# Patient Record
Sex: FEMALE | Race: BLACK OR AFRICAN AMERICAN | NOT HISPANIC OR LATINO | Employment: FULL TIME | URBAN - METROPOLITAN AREA
[De-identification: names, ages, dates, MRNs, and addresses within clinical notes are randomized per-mention and may not be internally consistent; named-entity substitution may affect disease eponyms.]

---

## 2017-01-30 ENCOUNTER — HOSPITAL ENCOUNTER (EMERGENCY)
Facility: MEDICAL CENTER | Age: 42
End: 2017-01-30
Attending: EMERGENCY MEDICINE
Payer: MEDICAID

## 2017-01-30 ENCOUNTER — APPOINTMENT (OUTPATIENT)
Dept: RADIOLOGY | Facility: MEDICAL CENTER | Age: 42
End: 2017-01-30
Attending: EMERGENCY MEDICINE
Payer: MEDICAID

## 2017-01-30 VITALS
DIASTOLIC BLOOD PRESSURE: 112 MMHG | BODY MASS INDEX: 24.91 KG/M2 | SYSTOLIC BLOOD PRESSURE: 167 MMHG | RESPIRATION RATE: 16 BRPM | TEMPERATURE: 97.3 F | OXYGEN SATURATION: 96 % | WEIGHT: 168.21 LBS | HEIGHT: 69 IN | HEART RATE: 92 BPM
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
## 2017-01-30 DIAGNOSIS — S09.90XA CHI (CLOSED HEAD INJURY), INITIAL ENCOUNTER: ICD-10-CM

## 2017-01-30 DIAGNOSIS — R04.0 EPISTAXIS: ICD-10-CM

## 2017-01-30 DIAGNOSIS — H11.32 SUBCONJUNCTIVAL HEMORRHAGE, LEFT: ICD-10-CM

## 2017-01-30 DIAGNOSIS — S01.21XA NASAL LACERATION, INITIAL ENCOUNTER: ICD-10-CM

## 2017-01-30 PROCEDURE — 700102 HCHG RX REV CODE 250 W/ 637 OVERRIDE(OP): Performed by: EMERGENCY MEDICINE

## 2017-01-30 PROCEDURE — A9270 NON-COVERED ITEM OR SERVICE: HCPCS | Performed by: EMERGENCY MEDICINE

## 2017-01-30 PROCEDURE — 70486 CT MAXILLOFACIAL W/O DYE: CPT

## 2017-01-30 PROCEDURE — 99285 EMERGENCY DEPT VISIT HI MDM: CPT

## 2017-01-30 PROCEDURE — 90471 IMMUNIZATION ADMIN: CPT

## 2017-01-30 PROCEDURE — 700111 HCHG RX REV CODE 636 W/ 250 OVERRIDE (IP): Performed by: EMERGENCY MEDICINE

## 2017-01-30 PROCEDURE — 90715 TDAP VACCINE 7 YRS/> IM: CPT | Performed by: EMERGENCY MEDICINE

## 2017-01-30 RX ORDER — TRAMADOL HYDROCHLORIDE 50 MG/1
50-100 TABLET ORAL EVERY 6 HOURS PRN
Qty: 20 TAB | Refills: 0 | Status: SHIPPED | OUTPATIENT
Start: 2017-01-30

## 2017-01-30 RX ORDER — CEPHALEXIN 500 MG/1
500 CAPSULE ORAL 4 TIMES DAILY
Qty: 28 CAP | Refills: 0 | Status: SHIPPED | OUTPATIENT
Start: 2017-01-30

## 2017-01-30 RX ORDER — HYDROCODONE BITARTRATE AND ACETAMINOPHEN 5; 325 MG/1; MG/1
1-2 TABLET ORAL EVERY 4 HOURS PRN
Qty: 21 TAB | Refills: 0 | Status: SHIPPED | OUTPATIENT
Start: 2017-01-30

## 2017-01-30 RX ORDER — TRAMADOL HYDROCHLORIDE 50 MG/1
50 TABLET ORAL ONCE
Status: COMPLETED | OUTPATIENT
Start: 2017-01-30 | End: 2017-01-30

## 2017-01-30 RX ORDER — TRAMADOL HYDROCHLORIDE 50 MG/1
100 TABLET ORAL ONCE
Status: COMPLETED | OUTPATIENT
Start: 2017-01-30 | End: 2017-01-30

## 2017-01-30 RX ADMIN — TRAMADOL HYDROCHLORIDE 50 MG: 50 TABLET, COATED ORAL at 01:20

## 2017-01-30 RX ADMIN — TRAMADOL HYDROCHLORIDE 50 MG: 50 TABLET, COATED ORAL at 01:27

## 2017-01-30 RX ADMIN — CLOSTRIDIUM TETANI TOXOID ANTIGEN (FORMALDEHYDE INACTIVATED), CORYNEBACTERIUM DIPHTHERIAE TOXOID ANTIGEN (FORMALDEHYDE INACTIVATED), BORDETELLA PERTUSSIS TOXOID ANTIGEN (GLUTARALDEHYDE INACTIVATED), BORDETELLA PERTUSSIS FILAMENTOUS HEMAGGLUTININ ANTIGEN (FORMALDEHYDE INACTIVATED), BORDETELLA PERTUSSIS PERTACTIN ANTIGEN, AND BORDETELLA PERTUSSIS FIMBRIAE 2/3 ANTIGEN 0.5 ML: 5; 2; 2.5; 5; 3; 5 INJECTION, SUSPENSION INTRAMUSCULAR at 01:20

## 2017-01-30 ASSESSMENT — LIFESTYLE VARIABLES: DO YOU DRINK ALCOHOL: NO

## 2017-01-30 ASSESSMENT — PAIN SCALES - GENERAL: PAINLEVEL_OUTOF10: 5

## 2017-01-30 NOTE — ED NOTES
Patient given discharge teaching and education. Verbalizes understanding. Given chance to ask questions, all questions answered. Educated patient of signs and symptoms to returned to ER, and educated patient they can return for any concerning symptoms. Three prescriptions provided to patient. Education given to patient about medications.Patient states they have their belongings. Denies additional needs at this time. Reports pain is well controlled. Patient monitored every hour and PRN for safety and comfort. Patient ambulatory out of department.
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
## 2017-01-30 NOTE — ED AVS SNAPSHOT
youwho Access Code: [email or web address]  Expires: 3/1/2017  2:38 AM    Your email address is not on file at [facility].  Email Addresses are required for you to sign up for youwho, please contact [phone] to verify your personal information and to provide your email address prior to attempting to register for youwho.    Padmini Tobin  No address on file    youwho  A secure, online tool to manage your health information     [facility]’s youwho® is a secure, online tool that connects you to your personalized health information from the privacy of your home -- day or night - making it very easy for you to manage your healthcare. Once the activation process is completed, you can even access your medical information using the youwho edmar, which is available for free in the Apple Edmar store or Google Play store.     To learn more about youwho, visit [email or web address]    There are two levels of access available (as shown below):   My Chart Features  [facility] Primary Care Doctor [facility]  Specialists [facility]  Urgent  Care Non-[facility] Primary Care Doctor   Email your healthcare team securely and privately 24/7 X X X    Manage appointments: schedule your next appointment; view details of past/upcoming appointments X      Request prescription refills. X      View recent personal medical records, including lab and immunizations X X X X   View health record, including health history, allergies, medications X X X X   Read reports about your outpatient visits, procedures, consult and ER notes X X X X   See your discharge summary, which is a recap of your hospital and/or ER visit that includes your diagnosis, lab results, and care plan X X  X     How to register for [employer]:  Once your e-mail address has been verified, follow the following steps to sign up for youwho.     1. Go to  [email or web address]  2. Click on the Sign Up Now box, which takes you to the New Member Sign Up page. You will need to provide the  following information:  a. Enter your [employer] Access Code exactly as it appears at the top of this page. (You will not need to use this code after you’ve completed the sign-up process. If you do not sign up before the expiration date, you must request a new code.)   b. Enter your date of birth.   c. Enter your home email address.   d. Click Submit, and follow the next screen’s instructions.  3. Create a CinemaNow ID. This will be your CinemaNow login ID and cannot be changed, so think of one that is secure and easy to remember.  4. Create a [employer]Now password. You can change your password at any time.  5. Enter your Password Reset Question and Answer. This can be used at a later time if you forget your password.   6. Enter your e-mail address. This allows you to receive e-mail notifications when new information is available in CinemaNow.  7. Click Sign Up. You can now view your health information.    For assistance activating your CinemaNow account, call [phone]
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
## 2017-01-30 NOTE — ED AVS SNAPSHOT
1/30/2017          Padmini Tobin  No address on file.    Dear Padmini:    [facility] wants to ensure your discharge home is safe and you or your loved ones have had all your questions answered regarding your care after you leave the hospital.    You may receive a telephone call within two days of your discharge.  This call is to make certain you understand your discharge instructions as well as ensure we provided you with the best care possible during your stay with us.     The call will only last approximately 3-5 minutes and will be done by a nurse.    Once again, we want to ensure your discharge home is safe and that you have a clear understanding of any next steps in your care.  If you have any questions or concerns, please do not hesitate to contact us, we are here for you.  Thank you for choosing [facility] for your healthcare needs.    Sincerely,    Hank Crisostomo    [facility]

## 2017-01-30 NOTE — DISCHARGE INSTRUCTIONS
Head Injuries, Adult    Return for worsening headache, repeated vomiting, confusion, seizure, unequal pupils or difficulty arousing from sleep.  Avoid activities that may cause a repeat concussion for 1-2 weeks. Take Ultram as needed for pain.    You have had a head injury which does not appear serious at this time. A concussion is a state of changed mental ability, usually from a blow to the head. You should take clear liquids for the rest of the day and then resume your regular diet. You should not take sedatives or alcoholic beverages for 48 hours after discharge. After injuries such as yours, most problems occur within the first 24 hours.    THESE MINOR SYMPTOMS MAY BE seen AFTER DISCHARGE:  · Memory difficulties  · Dizziness  · Headaches · Double vision  · Hearing difficulties   · Depression · Tiredness  · Weakness  · Difficulty with concentration ·      If you experience any of these problems, you should not be alarmed. A bruise on the brain (concussion) requires a few days for recovery. This is the same as a bruise elsewhere on the body. Many patients with head injuries frequently experience such symptoms. Usually, these problems disappear without medical care. If symptoms last for more than one day, notify your caregiver. See your caregiver sooner if symptoms are becoming worse rather than better.    · HOME CARE INSTRUCTIONS  · During the next 24 hours you must stay with someone who can watch you for the above warning signs.  · This person should wake you up every 30 minutes for 3 hours or as directed to check on your condition, noting any of the above signs or symptoms. Problems which are getting worse mean you should call or return immediately to the facility where you were just seen, or to the nearest emergency department. In case of emergency or unconsciousness, dial 911.    Although it is unlikely that serious side effects will occur, you should be aware of signs and symptoms which may necessitate your  return to this location. Side effects may occur up to 7 - 10 days following the injury.  It is important for you to carefully monitor your condition and contact your caregiver or seek immediate medical attention if there is a change in your condition.    · seek immediate medical attention if:  · There is confusion or drowsiness.   · You can not awaken the injured person.  · There is nausea (feeling sick to your stomach) or continued, forceful vomiting.  · You notice dizziness or unsteadiness which is getting worse, or inability to walk.  · You have convulsions or unconsciousness.  · You experience severe, persistent headaches not relieved by Tylenol®. (Do not take aspirin as this impairs clotting abilities). Take other pain medications only as directed.  · You can not use arms or legs normally.  · There are changes in pupil sizes. (This is the black center in the colored part of the eye)  · There is clear or bloody discharge from the nose or ears.    AGREEMENT BETWEEN PATIENT AND HEALTHCARE TEAM:  Your signature on this document represents an understanding between you and the healthcare team that took care of you today.  That means that you:  · Understand these discharge instructions.   · Will monitor your condition.  · Will seek immediate medical attention as instructed.    Document Released: 12/18/2006  Document Re-Released: 06/30/2008  [facility]® Patient Information ©2009 [employer].    Facial Fracture  A facial fracture is a break in one of the bones of your face.  HOME CARE INSTRUCTIONS   · Protect the injured part of your face until it is healed.  · Do not participate in activities which give chance for re-injury until your doctor approves.  · Gently wash and dry your face.  · Wear head and facial protection while riding a bicycle, motorcycle, or snowmobile.  SEEK MEDICAL CARE IF:   · An oral temperature above 102° F (38.9° C) develops.  · You have severe headaches or notice changes in your vision.  · You  have new numbness or tingling in your face.  · You develop nausea (feeling sick to your stomach), vomiting or a stiff neck.  SEEK IMMEDIATE MEDICAL CARE IF:   · You develop difficulty seeing or experience double vision.  · You become dizzy, lightheaded, or faint.  · You develop trouble speaking, breathing, or swallowing.  · You have a watery discharge from your nose or ear.  MAKE SURE YOU:   · Understand these instructions.  · Will watch your condition.  · Will get help right away if you are not doing well or get worse.  Document Released: 12/18/2006 Document Revised: 03/11/2013 Document Reviewed: 08/06/2009  [employer]® Patient Information ©2014 [employer], [employer].
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
## 2017-01-30 NOTE — ED AVS SNAPSHOT
After Visit Summary                                                                                                                Padmini Tobin   MRN: [id number]    Department:  [facility], Emergency Dept   Date of Visit:  1/30/2017            [facility], Emergency Dept    [address] [address]    Phone:  [phone]      You were seen by     1. Dash Pemberton M.D.    2. Zach Lozano M.D.      Your Diagnosis Was     CHI (closed head injury), initial encounter     S09.90XA       These are the medications you received during your hospitalization from 01/30/2017 0014 to 01/30/2017 0238     Date/Time Order Dose Route Action    01/30/2017 0120 tetanus-dipth-acell pertussis (ADACEL) inj 0.5 mL 0.5 mL Intramuscular Given    01/30/2017 0120 tramadol (ULTRAM) 50 MG tablet 100 mg 50 mg Oral Given    01/30/2017 0127 tramadol (ULTRAM) 50 MG tablet 50 mg 50 mg Oral Given      Follow-up Information     1. Follow up with [facility] In 1 week.    Contact information    [address]  [facility] [address] [phone]        2. Follow up with [facility], Emergency Dept.    Specialty:  Emergency Medicine    Why:  If symptoms worsen    Contact information    [address] [address] [phone]        3. Schedule an appointment as soon as possible for a visit with [facility].    Contact information    [address] [address] [phone]      Medication Information     Review all of your home medications and newly ordered medications with your primary doctor and/or pharmacist as soon as possible. Follow medication instructions as directed by your doctor and/or pharmacist.     Please keep your complete medication list with you and share with your physician. Update the information when medications are discontinued, doses are changed, or new medications (including over-the-counter  products) are added; and carry medication information at all times in the event of emergency situations.               Medication List      START taking these medications        Instructions    cephALEXin 500 MG Caps   Commonly known as:  KEFLEX    Take 1 Cap by mouth 4 times a day.   Dose:  500 mg       hydrocodone-acetaminophen 5-325 MG Tabs per tablet   Commonly known as:  NORCO    Take 1-2 Tabs by mouth every four hours as needed.   Dose:  1-2 Tab       tramadol 50 MG Tabs   Commonly known as:  ULTRAM    Take 1-2 Tabs by mouth every 6 hours as needed for Mild Pain.   Dose:   mg               Procedures and tests performed during your visit     CT-MAXILLOFACIAL W/O PLUS RECONS    ER EYE BOX    NURSING COMMUNICATION    VISUAL ACUITY SCREENING        Discharge Instructions       Head Injuries, Adult    Return for worsening headache, repeated vomiting, confusion, seizure, unequal pupils or difficulty arousing from sleep.  Avoid activities that may cause a repeat concussion for 1-2 weeks. Take Ultram as needed for pain.    You have had a head injury which does not appear serious at this time. A concussion is a state of changed mental ability, usually from a blow to the head. You should take clear liquids for the rest of the day and then resume your regular diet. You should not take sedatives or alcoholic beverages for 48 hours after discharge. After injuries such as yours, most problems occur within the first 24 hours.    THESE MINOR SYMPTOMS MAY BE seen AFTER DISCHARGE:  · Memory difficulties  · Dizziness  · Headaches · Double vision  · Hearing difficulties   · Depression · Tiredness  · Weakness  · Difficulty with concentration ·      If you experience any of these problems, you should not be alarmed. A bruise on the brain (concussion) requires a few days for recovery. This is the same as a bruise elsewhere on the body. Many patients with head injuries frequently experience such symptoms. Usually, these  problems disappear without medical care. If symptoms last for more than one day, notify your caregiver. See your caregiver sooner if symptoms are becoming worse rather than better.    · HOME CARE INSTRUCTIONS  · During the next 24 hours you must stay with someone who can watch you for the above warning signs.  · This person should wake you up every 30 minutes for 3 hours or as directed to check on your condition, noting any of the above signs or symptoms. Problems which are getting worse mean you should call or return immediately to the facility where you were just seen, or to the nearest emergency department. In case of emergency or unconsciousness, dial 911.    Although it is unlikely that serious side effects will occur, you should be aware of signs and symptoms which may necessitate your return to this location. Side effects may occur up to 7 - 10 days following the injury.  It is important for you to carefully monitor your condition and contact your caregiver or seek immediate medical attention if there is a change in your condition.    · seek immediate medical attention if:  · There is confusion or drowsiness.   · You can not awaken the injured person.  · There is nausea (feeling sick to your stomach) or continued, forceful vomiting.  · You notice dizziness or unsteadiness which is getting worse, or inability to walk.  · You have convulsions or unconsciousness.  · You experience severe, persistent headaches not relieved by Tylenol®. (Do not take aspirin as this impairs clotting abilities). Take other pain medications only as directed.  · You can not use arms or legs normally.  · There are changes in pupil sizes. (This is the black center in the colored part of the eye)  · There is clear or bloody discharge from the nose or ears.    AGREEMENT BETWEEN PATIENT AND HEALTHCARE TEAM:  Your signature on this document represents an understanding between you and the healthcare team that took care of you today.  That  means that you:  · Understand these discharge instructions.   · Will monitor your condition.  · Will seek immediate medical attention as instructed.    Document Released: 12/18/2006  Document Re-Released: 06/30/2008  ExitCare® Patient Information ©2009 [employer].    Facial Fracture  A facial fracture is a break in one of the bones of your face.  HOME CARE INSTRUCTIONS   · Protect the injured part of your face until it is healed.  · Do not participate in activities which give chance for re-injury until your doctor approves.  · Gently wash and dry your face.  · Wear head and facial protection while riding a bicycle, motorcycle, or snowmobile.  SEEK MEDICAL CARE IF:   · An oral temperature above 102° F (38.9° C) develops.  · You have severe headaches or notice changes in your vision.  · You have new numbness or tingling in your face.  · You develop nausea (feeling sick to your stomach), vomiting or a stiff neck.  SEEK IMMEDIATE MEDICAL CARE IF:   · You develop difficulty seeing or experience double vision.  · You become dizzy, lightheaded, or faint.  · You develop trouble speaking, breathing, or swallowing.  · You have a watery discharge from your nose or ear.  MAKE SURE YOU:   · Understand these instructions.  · Will watch your condition.  · Will get help right away if you are not doing well or get worse.  Document Released: 12/18/2006 Document Revised: 03/11/2013 Document Reviewed: 08/06/2009  ExitCare® Patient Information ©2014 [employer].            Patient Information     Patient Information    Following emergency treatment: all patient requiring follow-up care must return either to a private physician or a clinic if your condition worsens before you are able to obtain further medical attention, please return to the emergency room.     Billing Information    At [facility], we work to make the billing process streamlined for our patients.  Our Representatives are here to answer any questions you may have  regarding your hospital bill.  If you have insurance coverage and have supplied your insurance information to us, we will submit a claim to your insurer on your behalf.  Should you have any questions regarding your bill, we can be reached online or by phone as follows:  Online: You are able pay your bills online or live chat with our representatives about any billing questions you may have. We are here to help Monday - Friday from 8:00am to 7:30pm and 9:00am - 12:00pm on Saturdays.  Please visit [email or web address]  for more information.   Phone:  [phone] or [phone]    Please note that your emergency physician, surgeon, pathologist, radiologist, anesthesiologist, and other specialists are not employed by [facility] and will therefore bill separately for their services.  Please contact them directly for any questions concerning their bills at the numbers below:     Emergency Physician Services:  [phone]  [facility]:  [phone]  [facility]:  [phone]  [facility]:  [phone]    1. Your final bill may vary from the amount quoted upon discharge if all procedures are not complete at that time, or if your doctor has additional procedures of which we are not aware. You will receive an additional bill if you return to the Emergency Department at [facility] for suture removal regardless of the facility of which the sutures were placed.     2. Please arrange for settlement of this account at the emergency registration.    3. All self-pay accounts are due in full at the time of treatment.  If you are unable to meet this obligation then payment is expected within 4-5 days.     4. If you have had radiology studies (CT, X-ray, Ultrasound, MRI), you have received a preliminary result during your emergency department visit. Please contact the radiology department [phone] to receive a copy of your final result.  Please discuss the Final result with your primary physician or with the follow up physician provided.     Crisis Hotline:  [place] Crisis Hotline:  6-128-WRLOPSJ or 1-517.243.1047  [place] Crisis Hotline:    1-772.536.3494 or 512-131-7651         ED Discharge Follow Up Questions    1. In order to provide you with very good care, we would like to follow up with a phone call in the next few days.  May we have your permission to contact you?     YES /  NO    2. What is the best phone number to call you? (       )_____-__________    3. What is the best time to call you?      Morning  /  Afternoon  /  Evening                   Patient Signature:  ____________________________________________________________    Date:  ____________________________________________________________

## 2017-01-30 NOTE — ED NOTES
Visual acuity performed on pt. Patients left eye swollen shut and she states that she is unable to see anything. Right eye is 20/50.

## 2017-02-21 ENCOUNTER — APPOINTMENT (OUTPATIENT)
Dept: RADIOLOGY | Facility: MEDICAL CENTER | Age: 42
End: 2017-02-21
Attending: EMERGENCY MEDICINE
Payer: MEDICAID

## 2017-02-21 ENCOUNTER — HOSPITAL ENCOUNTER (EMERGENCY)
Facility: MEDICAL CENTER | Age: 42
End: 2017-02-21
Attending: EMERGENCY MEDICINE
Payer: MEDICAID

## 2017-02-21 VITALS
TEMPERATURE: 96.4 F | SYSTOLIC BLOOD PRESSURE: 140 MMHG | HEART RATE: 88 BPM | BODY MASS INDEX: 24.8 KG/M2 | WEIGHT: 167.99 LBS | DIASTOLIC BLOOD PRESSURE: 98 MMHG | OXYGEN SATURATION: 99 % | RESPIRATION RATE: 16 BRPM

## 2017-02-21 DIAGNOSIS — M25.442 SWELLING OF JOINT, HAND, LEFT: ICD-10-CM

## 2017-02-21 PROCEDURE — 99284 EMERGENCY DEPT VISIT MOD MDM: CPT

## 2017-02-21 PROCEDURE — 73130 X-RAY EXAM OF HAND: CPT | Mod: LT

## 2017-02-21 RX ORDER — IBUPROFEN 600 MG/1
600 TABLET ORAL EVERY 6 HOURS PRN
Qty: 30 TAB | Refills: 0 | Status: SHIPPED | OUTPATIENT
Start: 2017-02-21

## 2017-02-21 ASSESSMENT — LIFESTYLE VARIABLES: DO YOU DRINK ALCOHOL: NO

## 2017-02-21 ASSESSMENT — PAIN SCALES - GENERAL: PAINLEVEL_OUTOF10: 8

## 2017-02-21 NOTE — ED AVS SNAPSHOT
Home Care Instructions                                                                                                                Padmini Tobin   MRN: [id number]    Department:  [facility], Emergency Dept   Date of Visit:  2/21/2017            [facility], Emergency Dept    [address] [address]    Phone:  [phone]      You were seen by     Rosemary Bernard M.D.      Your Diagnosis Was     Swelling of joint, hand, left     M25.442       Follow-up Information     1. Follow up with Gab Patricio M.D.. Call in 1 day.    Specialty:  Orthopaedics    Why:  to establish care, for recheck    Contact information    [address]  [address]  [facility] [address] [phone]          2. [facility]    Why:  for recheck, to establish care    Contact information    [address] [address] [phone]    Additional information:    [facility]      Medication Information     Review all of your home medications and newly ordered medications with your primary doctor and/or pharmacist as soon as possible. Follow medication instructions as directed by your doctor and/or pharmacist.     Please keep your complete medication list with you and share with your physician. Update the information when medications are discontinued, doses are changed, or new medications (including over-the-counter products) are added; and carry medication information at all times in the event of emergency situations.               Medication List      START taking these medications        Instructions    ibuprofen 600 MG Tabs   Commonly known as:  MOTRIN    Take 1 Tab by mouth every 6 hours as needed.   Dose:  600 mg         ASK your doctor about these medications        Instructions    cephALEXin 500 MG Caps   Commonly known as:  KEFLEX    Take 1 Cap by mouth 4 times a day.   Dose:  500 mg       hydrocodone-acetaminophen 5-325 MG Tabs per tablet   Commonly known as:  NORCO    Take 1-2 Tabs by mouth every four hours as needed.   Dose:  1-2 Tab       tramadol 50 MG Tabs   Commonly known as:  ULTRAM    Take 1-2 Tabs by mouth every 6 hours as needed for Mild Pain.   Dose:   mg               Procedures and tests performed during your visit     DX-HAND 3+ LEFT    SPLINT APPLICATION        Discharge Instructions       Hand Injuries  Minor breaks (fractures), sprains, bruises (contusions), and burns of the hand are all examples of hand injuries. A fracture is a break in the bone. A sprain means that ligaments have been stretched or torn. A contusion is the result of an injury that caused bleeding under the skin. Burns are damage to the skin that occurs when the hand comes in contact with something very hot (or with certain chemicals).   HOME CARE INSTRUCTIONS  · For sprains, keep your hand raised (elevated) above the level of your heart. Do this until the pain and swelling improve.   · Use hand bandages (dressings) and splints to reduce motion, relieve pain, and prevent reinjury as directed. The dressing and splint should not be removed without your caregiver's approval.   · Put ice on the injured area to reduce pain and swelling due to fractures, sprains, and deep bruises.   · Put ice in a plastic bag.   · Place a towel between your skin and the bag.   · Leave the ice on for 15-20 minutes, 3-4 times a day. Do this for 2 3 days.   · Only take over-the-counter or prescription medicines as directed by your caregiver.   · Follow up with your caregiver or a specialist as directed.   Early motion exercises are sometimes needed to reduce joint stiffness after a hand injury. However, your hand should not be used for any activities that increase pain.  SEEK MEDICAL CARE IF:  · Your pain or swelling does not improve in 2 3 days.   SEEK IMMEDIATE MEDICAL CARE IF:  · You have increased pain, swelling, or redness in your  hand.   · Your pain is not controlled with medicine.   · You have a fever or persistent symptoms for more than 2 3 days.   · You have a fever and your symptoms suddenly get worse.   · You have pain when moving your fingers.   · You have pus coming from the wound.   · You have numbness in your fingers.   MAKE SURE YOU:  · Understand these instructions.   · Will watch your condition.   · Will get help right away if you are not doing well or get worse.   Document Released: 01/25/2006 Document Revised: 09/11/2013 Document Reviewed: 06/30/2013  ExitCare® Patient Information ©2013 [employer].          Patient Information     Patient Information    Following emergency treatment: all patient requiring follow-up care must return either to a private physician or a clinic if your condition worsens before you are able to obtain further medical attention, please return to the emergency room.     Billing Information    At [facility], we work to make the billing process streamlined for our patients.  Our Representatives are here to answer any questions you may have regarding your hospital bill.  If you have insurance coverage and have supplied your insurance information to us, we will submit a claim to your insurer on your behalf.  Should you have any questions regarding your bill, we can be reached online or by phone as follows:  Online: You are able pay your bills online or live chat with our representatives about any billing questions you may have. We are here to help Monday - Friday from 8:00am to 7:30pm and 9:00am - 12:00pm on Saturdays.  Please visit [email or web address]  for more information.   Phone:  [phone] or [phone]    Please note that your emergency physician, surgeon, pathologist, radiologist, anesthesiologist, and other specialists are not employed by [facility] and will therefore bill separately for their services.  Please contact them directly for any questions  concerning their bills at the numbers below:     Emergency Physician Services:  [phone]  [facility]:  [phone]  [facility]:  [phone]  [facility]:  [phone]    1. Your final bill may vary from the amount quoted upon discharge if all procedures are not complete at that time, or if your doctor has additional procedures of which we are not aware. You will receive an additional bill if you return to the Emergency Department at [facility] for suture removal regardless of the facility of which the sutures were placed.     2. Please arrange for settlement of this account at the emergency registration.    3. All self-pay accounts are due in full at the time of treatment.  If you are unable to meet this obligation then payment is expected within 4-5 days.     4. If you have had radiology studies (CT, X-ray, Ultrasound, MRI), you have received a preliminary result during your emergency department visit. Please contact the radiology department [phone] to receive a copy of your final result. Please discuss the Final result with your primary physician or with the follow up physician provided.     Crisis Hotline:  [place] Crisis Hotline:  6-398-OQOQWCI or 1-602.742.7568  [place] Crisis Hotline:    1-340.313.3175 or 362-752-0020         ED Discharge Follow Up Questions    1. In order to provide you with very good care, we would like to follow up with a phone call in the next few days.  May we have your permission to contact you?     YES /  NO    2. What is the best phone number to call you? (       )_____-__________    3. What is the best time to call you?      Morning  /  Afternoon  /  Evening                   Patient Signature:  ____________________________________________________________    Date:  ____________________________________________________________

## 2017-02-21 NOTE — ED AVS SNAPSHOT
[employer] Access Code: Activation code not generated  Current [employer] Status: Active    [employer]  A secure, online tool to manage your health information     [employer]’s [employer]® is a secure, online tool that connects you to your personalized health information from the privacy of your home -- day or night - making it very easy for you to manage your healthcare. Once the activation process is completed, you can even access your medical information using the [employer] edmar, which is available for free in the Apple Edmar store or Google Play store.     Vision Technologies provides the following levels of access (as shown below):   My Chart Features   [facility] Primary Care Doctor [facility]  Specialists [facility]  Urgent  Care Non-[facility]  Primary Care  Doctor   Email your healthcare team securely and privately 24/7 X X X X   Manage appointments: schedule your next appointment; view details of past/upcoming appointments X      Request prescription refills. X      View recent personal medical records, including lab and immunizations X X X X   View health record, including health history, allergies, medications X X X X   Read reports about your outpatient visits, procedures, consult and ER notes X X X X   See your discharge summary, which is a recap of your hospital and/or ER visit that includes your diagnosis, lab results, and care plan. X X       How to register for [employer]:  1. Go to  [email or web address].  2. Click on the Sign Up Now box, which takes you to the New Member Sign Up page. You will need to provide the following information:  a. Enter your [employer] Access Code exactly as it appears at the top of this page. (You will not need to use this code after you’ve completed the sign-up process. If you do not sign up before the expiration date, you must request a new code.)   b. Enter your date of birth.   c. Enter your home email address.   d. Click Submit, and follow the next screen’s instructions.  3. Create a [employer] ID. This will  be your Online [employer] login ID and cannot be changed, so think of one that is secure and easy to remember.  4. Create a [employer] password. You can change your password at any time.  5. Enter your Password Reset Question and Answer. This can be used at a later time if you forget your password.   6. Enter your e-mail address. This allows you to receive e-mail notifications when new information is available in Online [employer].  7. Click Sign Up. You can now view your health information.    For assistance activating your [employer] account, call [phone]

## 2017-02-21 NOTE — ED AVS SNAPSHOT
2/21/2017          Padmini Tobin  [address]  [place] [address]    Dear Padmini:    [facility] wants to ensure your discharge home is safe and you or your loved ones have had all your questions answered regarding your care after you leave the hospital.    You may receive a telephone call within two days of your discharge.  This call is to make certain you understand your discharge instructions as well as ensure we provided you with the best care possible during your stay with us.     The call will only last approximately 3-5 minutes and will be done by a nurse.    Once again, we want to ensure your discharge home is safe and that you have a clear understanding of any next steps in your care.  If you have any questions or concerns, please do not hesitate to contact us, we are here for you.  Thank you for choosing [facility] for your healthcare needs.    Sincerely,    Hank Crisostomo    [facility]

## 2017-02-22 NOTE — DISCHARGE INSTRUCTIONS
Hand Injuries  Minor breaks (fractures), sprains, bruises (contusions), and burns of the hand are all examples of hand injuries. A fracture is a break in the bone. A sprain means that ligaments have been stretched or torn. A contusion is the result of an injury that caused bleeding under the skin. Burns are damage to the skin that occurs when the hand comes in contact with something very hot (or with certain chemicals).   HOME CARE INSTRUCTIONS  · For sprains, keep your hand raised (elevated) above the level of your heart. Do this until the pain and swelling improve.   · Use hand bandages (dressings) and splints to reduce motion, relieve pain, and prevent reinjury as directed. The dressing and splint should not be removed without your caregiver's approval.   · Put ice on the injured area to reduce pain and swelling due to fractures, sprains, and deep bruises.   · Put ice in a plastic bag.   · Place a towel between your skin and the bag.   · Leave the ice on for 15-20 minutes, 3-4 times a day. Do this for 2 3 days.   · Only take over-the-counter or prescription medicines as directed by your caregiver.   · Follow up with your caregiver or a specialist as directed.   Early motion exercises are sometimes needed to reduce joint stiffness after a hand injury. However, your hand should not be used for any activities that increase pain.  SEEK MEDICAL CARE IF:  · Your pain or swelling does not improve in 2 3 days.   SEEK IMMEDIATE MEDICAL CARE IF:  · You have increased pain, swelling, or redness in your hand.   · Your pain is not controlled with medicine.   · You have a fever or persistent symptoms for more than 2 3 days.   · You have a fever and your symptoms suddenly get worse.   · You have pain when moving your fingers.   · You have pus coming from the wound.   · You have numbness in your fingers.   MAKE SURE YOU:  · Understand these instructions.   · Will watch your condition.   · Will get help right away if you are not  doing well or get worse.   Document Released: 01/25/2006 Document Revised: 09/11/2013 Document Reviewed: 06/30/2013  [employer]® Patient Information ©2013 [employer], [employer].

## 2017-02-22 NOTE — ED NOTES
Pt given d/c instructions/prescription/home care instructions, given 1 Rx, pt verbalized understanding of POC, pt ambulated to ER lobby, steady gait.

## 2017-02-22 NOTE — ED NOTES
Patient says her left hand is painful and swollen, difficult to move.  NO known injury.  Hurting for a couple of weeks now.  No major deformity noted.  NO major swelling.  She is concerned it is fractured possible but not sure how.

## 2017-02-22 NOTE — ED PROVIDER NOTES
ED Provider Note    Scribed for Rosemary Bernard M.D. by Des Nova. 2/21/2017  7:45 PM    Primary care provider: Pcp Pt States None  Means of arrival: walk in  History obtained from: patient  History limited by: none    CHIEF COMPLAINT  Chief Complaint   Patient presents with   • Hand Pain       HPI  Padmini Tobin is a 41 y.o. female who presents to the Emergency Department with left hand pain starting 2 weeks ago. Patient repots associated mild swelling. 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 She denies trauma, fever, sore throat. Patient denies a history of arthritis.     REVIEW OF SYSTEMS    EYES: no discharge, redness, or vision changes  GI: no vomiting, diarrhea, or abdominal pain   Neuro: no weakness, numbness, aphasia, or headache  Musculoskeletal: Positive hand pain, hand swelling. No trauma.   Endocrine: no fevers, sweating, or weight loss   SKIN: no rash, erythema.    See history of present illness.       PAST MEDICAL HISTORY       SURGICAL HISTORY   has past surgical history that includes other orthopedic surgery.    SOCIAL HISTORY  Social History   Substance Use Topics   • Smoking status: None noted.    • Smokeless tobacco: None noted.    • Alcohol Use: None noted.       History   Drug Use None noted.        FAMILY HISTORY  None noted.     CURRENT MEDICATIONS  No current facility-administered medications for this encounter.    Current outpatient prescriptions:   •  ibuprofen (MOTRIN) 600 MG Tab, Take 1 Tab by mouth every 6 hours as needed., Disp: 30 Tab, Rfl: 0  •  tramadol (ULTRAM) 50 MG Tab, Take 1-2 Tabs by mouth every 6 hours as needed for Mild Pain., Disp: 20 Tab, Rfl: 0  •  cephALEXin (KEFLEX) 500 MG Cap, Take 1 Cap by mouth 4 times a day., Disp: 28 Cap, Rfl: 0  •  hydrocodone-acetaminophen (NORCO) 5-325 MG Tab per tablet, Take 1-2 Tabs by mouth every four hours as needed., Disp: 21 Tab, Rfl: 0    ALLERGIES  No Known  Allergies    PHYSICAL EXAM  VITAL SIGNS: /89 mmHg  Pulse 93  Temp(Src) 35.8 °C (96.4 °F)  Resp 14  Wt 76.2 kg (167 lb 15.9 oz)  SpO2 99%    Constitutional: No distress  Skin: Warm, dry.   Musculoskeletal: Tenderness to left hand MCP joints. Mild swelling on the dorsal aspect of the first second and third left MCP joints. Distally neurovascularly intact. Unable to grasp or flex. No trauma.   Vascular: warm to touch good capillary refill   Neurologic: distally neurovascularly intact  Psychiatric: Affect normal    DIAGNOSTIC STUDIES/PROCEDURES    RADIOLOGY  DX-HAND 3+ LEFT   Final Result         1.  No acute traumatic bony injury.      The radiologist's interpretation of all radiological studies have been reviewed by me.    COURSE & MEDICAL DECISION MAKING  Nursing notes, VS, PMSFHx reviewed in chart.    7:45 PM - Patient seen and examined at bedside. Ordered DX hand to evaluate her symptoms. Differential diagnoses include, but are not limited to, occult fracture, gout      I reviewed prescription monitoring program for patient's narcotic use before prescribing a scheduled drug.The patient will not drink alcohol nor drive with prescribed medications. The patient will return for new or worsening symptoms and is stable at the time of discharge.    The patient is referred to a primary physician for blood pressure management, diabetic screening, and for all other preventative health concerns.    DISPOSITION:  Patient will be discharged home in stable condition.    FOLLOW UP:  Gab Patricio M.D.  [address]  [address]  [facility] [address]  [phone]    Call in 1 day  to establish care, for recheck    [address] [address] [phone]  Call in 1 day  for recheck, to establish care      OUTPATIENT MEDICATIONS:  New Prescriptions    IBUPROFEN (MOTRIN) 600 MG TAB    Take 1 Tab by mouth every 6 hours as needed.           FINAL IMPRESSION  1.  Swelling of joint, hand, left          I, Des Nova (Scribe), am scribing for, and in the presence of, Rosemary Bernard M.D..    Electronically signed by: Des Nova (Scribe), 2/21/2017    IRosemary M.D. personally performed the services described in this documentation, as scribed by Des Nova in my presence, and it is both accurate and complete.    The note accurately reflects work and decisions made by me.  Rosemary Bernard  2/21/2017  8:26 PM